# Patient Record
Sex: FEMALE | Race: BLACK OR AFRICAN AMERICAN | HISPANIC OR LATINO | Employment: FULL TIME | URBAN - METROPOLITAN AREA
[De-identification: names, ages, dates, MRNs, and addresses within clinical notes are randomized per-mention and may not be internally consistent; named-entity substitution may affect disease eponyms.]

---

## 2018-09-09 ENCOUNTER — OFFICE VISIT (OUTPATIENT)
Dept: URGENT CARE | Facility: CLINIC | Age: 23
End: 2018-09-09
Payer: COMMERCIAL

## 2018-09-09 VITALS
OXYGEN SATURATION: 96 % | HEART RATE: 82 BPM | SYSTOLIC BLOOD PRESSURE: 138 MMHG | WEIGHT: 179 LBS | RESPIRATION RATE: 16 BRPM | HEIGHT: 70 IN | TEMPERATURE: 98.8 F | DIASTOLIC BLOOD PRESSURE: 86 MMHG | BODY MASS INDEX: 25.62 KG/M2

## 2018-09-09 DIAGNOSIS — J02.9 SORE THROAT: ICD-10-CM

## 2018-09-09 DIAGNOSIS — J06.9 VIRAL UPPER RESPIRATORY TRACT INFECTION WITH COUGH: ICD-10-CM

## 2018-09-09 LAB
INT CON NEG: NEGATIVE
INT CON POS: POSITIVE
S PYO AG THROAT QL: NEGATIVE

## 2018-09-09 PROCEDURE — 99204 OFFICE O/P NEW MOD 45 MIN: CPT | Performed by: NURSE PRACTITIONER

## 2018-09-09 PROCEDURE — 87880 STREP A ASSAY W/OPTIC: CPT | Performed by: NURSE PRACTITIONER

## 2018-09-09 RX ORDER — BENZONATATE 100 MG/1
100 CAPSULE ORAL 3 TIMES DAILY PRN
Qty: 30 CAP | Refills: 0 | Status: SHIPPED | OUTPATIENT
Start: 2018-09-09 | End: 2023-06-27

## 2018-09-09 NOTE — PROGRESS NOTES
"Chief Complaint   Patient presents with   • Cough     sob, hurts to breath x 3 days        HISTORY OF PRESENT ILLNESS: Patient is a 23 y.o. female who presents today due to complaints of a sore throat for the past three days. Reports associated cough, ear pain, and pain with swallowing. Pain is currently rated as moderate. Denies associated congestion, fever, sinus pressure, or difficulty breathing. She has tried OTC medications at home without much improvement. She admits to several friends who have strep, would like to be tested.      There are no active problems to display for this patient.      Allergies:Penicillins    No current Epic-ordered outpatient prescriptions on file.     No current Epic-ordered facility-administered medications on file.        History reviewed. No pertinent past medical history.    Social History   Substance Use Topics   • Smoking status: Never Smoker   • Smokeless tobacco: Never Used   • Alcohol use Yes       No family status information on file.   History reviewed. No pertinent family history.    ROS:  Review of Systems   Constitutional: Negative for fever, chills. Negative for weight loss and malaise/fatigue.   HENT: Positive for sore throat. Negative for ear pain, nosebleeds, congestion.   Eyes: Negative for vision changes.   Cardiovascular: Negative for chest pain, palpitations, orthopnea and leg swelling.   Respiratory: Positive for cough. Negative for sputum production, shortness of breath and wheezing.   Gastrointestinal: Negative for abdominal pain, nausea, vomiting or diarrhea.   Skin: Negative for rash, diaphoresis.     Exam:  Blood pressure 138/86, pulse 82, temperature 37.1 °C (98.8 °F), resp. rate 16, height 1.778 m (5' 10\"), weight 81.2 kg (179 lb), SpO2 96 %.  General: well-nourished, well-developed female in NAD  Head: normocephalic, atraumatic  Eyes: PERRLA, no conjunctival injection, acuity grossly intact, lids normal.  Ears: normal shape and symmetry, no tenderness, " no discharge. External canals are without any significant edema or erythema. Tympanic membranes are without any inflammation, no effusion. Gross auditory acuity is intact.  Nose: symmetrical without tenderness, no discharge.  Mouth/Throat: reasonable hygiene. There is minimal erythema, without exudates or tonsillar enlargement present.  Neck: no masses, range of motion within normal limits, no tracheal deviation. No obvious thyroid enlargement.   Lymph: bilateral anterior cervical adenopathy. No supraclavicular adenopathy.   Neuro: alert and oriented. Cranial nerves 1-12 grossly intact. No sensory deficit.   Cardiovascular: regular rate and rhythm. No edema.  Pulmonary: no distress. Chest is symmetrical with respiration, no wheezes, crackles, or rhonchi.   Musculoskeletal: no clubbing, appropriate muscle tone, gait is stable.  Skin: warm, dry, intact, no clubbing, no cyanosis, no rashes.   Psych: appropriate mood, affect, judgement.         Assessment/Plan:  1. Viral upper respiratory tract infection with cough  benzonatate (TESSALON) 100 MG Cap   2. Sore throat  POCT Rapid Strep A    maalox plus-benadryl-visc lidocaine (MAGIC MOUTHWASH)             POC strep negative      Discussed symptoms most likely viral, self limiting illness. Did not see any evidence of a bacterial process. Discussed natural progression and sx care. OTC motrin or tylenol for pain/fever control. Hand hygiene. Increase fluid intake, rest. Warm salt water gargles. Tessalon and MBX PRN.   Supportive care, differential diagnoses, and indications for immediate follow-up discussed with patient.   Pathogenesis of diagnosis discussed including typical length and natural progression.   Instructed to return to clinic or nearest emergency department for any change in condition, further concerns, or worsening of symptoms.  Patient states understanding of the plan of care and discharge instructions.  Instructed to make an appointment, for follow up, with  her primary care provider.        Please note that this dictation was created using voice recognition software. I have made every reasonable attempt to correct obvious errors, but I expect that there are errors of grammar and possibly content that I did not discover before finalizing the note.      PRADEEP Mejia.

## 2022-11-10 ENCOUNTER — OFFICE VISIT (OUTPATIENT)
Dept: URGENT CARE | Facility: PHYSICIAN GROUP | Age: 27
End: 2022-11-10
Payer: COMMERCIAL

## 2022-11-10 VITALS
RESPIRATION RATE: 16 BRPM | HEART RATE: 94 BPM | SYSTOLIC BLOOD PRESSURE: 110 MMHG | WEIGHT: 185 LBS | TEMPERATURE: 98.3 F | HEIGHT: 70 IN | OXYGEN SATURATION: 95 % | DIASTOLIC BLOOD PRESSURE: 60 MMHG | BODY MASS INDEX: 26.48 KG/M2

## 2022-11-10 DIAGNOSIS — R06.2 WHEEZE: ICD-10-CM

## 2022-11-10 DIAGNOSIS — J22 LRTI (LOWER RESPIRATORY TRACT INFECTION): ICD-10-CM

## 2022-11-10 PROCEDURE — 99203 OFFICE O/P NEW LOW 30 MIN: CPT | Performed by: FAMILY MEDICINE

## 2022-11-10 RX ORDER — DEXAMETHASONE 6 MG/1
6 TABLET ORAL DAILY
Qty: 3 TABLET | Refills: 0 | Status: SHIPPED | OUTPATIENT
Start: 2022-11-10 | End: 2023-06-27

## 2022-11-10 RX ORDER — ALBUTEROL SULFATE 90 UG/1
2 AEROSOL, METERED RESPIRATORY (INHALATION) EVERY 6 HOURS PRN
Qty: 8.5 G | Refills: 3 | Status: SHIPPED | OUTPATIENT
Start: 2022-11-10 | End: 2023-06-27

## 2022-11-10 RX ORDER — DEXTROMETHORPHAN HYDROBROMIDE AND PROMETHAZINE HYDROCHLORIDE 15; 6.25 MG/5ML; MG/5ML
5 SYRUP ORAL EVERY 6 HOURS PRN
Qty: 120 ML | Refills: 0 | Status: SHIPPED | OUTPATIENT
Start: 2022-11-10 | End: 2023-06-27

## 2022-11-10 RX ORDER — DOXYCYCLINE HYCLATE 100 MG
100 TABLET ORAL EVERY 12 HOURS
Qty: 14 TABLET | Refills: 0 | Status: SHIPPED | OUTPATIENT
Start: 2022-11-10 | End: 2022-11-17

## 2022-11-10 ASSESSMENT — ENCOUNTER SYMPTOMS
COUGH: 1
HEADACHES: 1

## 2022-11-10 NOTE — PROGRESS NOTES
"Anayeli Barnhart is a 27 y.o. female who presents with Cough (Runny nose, headache, x11 days )      - This is a pleasant and nontoxic appearing 27 y.o. female who has come to the walk-in clinic today for:    #1) ~12 days ago developed cough and sinus congestion, not improving on its own. Worse at night and keeps up. No associated NVFC. Did a cpl home covid tests and negative.       ALLERGIES:  Penicillins     PMH:  History reviewed. No pertinent past medical history.     PSH:  History reviewed. No pertinent surgical history.    MEDS:    Current Outpatient Medications:     doxycycline (VIBRAMYCIN) 100 MG Tab, Take 1 Tablet by mouth every 12 hours for 7 days., Disp: 14 Tablet, Rfl: 0    promethazine-dextromethorphan (PROMETHAZINE-DM) 6.25-15 MG/5ML syrup, Take 5 mL by mouth every 6 hours as needed for Cough., Disp: 120 mL, Rfl: 0    dexamethasone (DECADRON) 6 MG Tab, Take 1 Tablet by mouth every day., Disp: 3 Tablet, Rfl: 0    albuterol 108 (90 Base) MCG/ACT Aero Soln inhalation aerosol, Inhale 2 Puffs every 6 hours as needed for Shortness of Breath., Disp: 8.5 g, Rfl: 3    benzonatate (TESSALON) 100 MG Cap, Take 1 Cap by mouth 3 times a day as needed., Disp: 30 Cap, Rfl: 0    maalox plus-benadryl-visc lidocaine (MAGIC MOUTHWASH), Take 5 mL by mouth every 6 hours as needed., Disp: 100 mL, Rfl: 0    ** I have documented what I find to be significant in regards to past medical, social, family and surgical history  in my HPI or under PMH/PSH/FH review section, otherwise it is noncontributory **         HPI    Review of Systems   HENT:  Positive for congestion.    Respiratory:  Positive for cough.    Neurological:  Positive for headaches.   All other systems reviewed and are negative.           Objective     /60 (BP Location: Right arm, Patient Position: Sitting, BP Cuff Size: Adult)   Pulse 94   Temp 36.8 °C (98.3 °F) (Temporal)   Resp 16   Ht 1.778 m (5' 10\")   Wt 83.9 kg (185 lb)   SpO2 95%   " BMI 26.54 kg/m²      Physical Exam  Vitals and nursing note reviewed.   Constitutional:       General: She is not in acute distress.     Appearance: Normal appearance. She is well-developed.   HENT:      Head: Normocephalic.      Mouth/Throat:      Mouth: Mucous membranes are moist.      Pharynx: Oropharynx is clear. Posterior oropharyngeal erythema present. No oropharyngeal exudate.   Cardiovascular:      Heart sounds: Normal heart sounds. No murmur heard.  Pulmonary:      Effort: Pulmonary effort is normal. No respiratory distress.      Breath sounds: Wheezing (mild exp wheeze) present.   Neurological:      Mental Status: She is alert.      Motor: No abnormal muscle tone.   Psychiatric:         Mood and Affect: Mood normal.         Behavior: Behavior normal.                           Assessment & Plan     1. LRTI (lower respiratory tract infection)  doxycycline (VIBRAMYCIN) 100 MG Tab    promethazine-dextromethorphan (PROMETHAZINE-DM) 6.25-15 MG/5ML syrup      2. Wheeze  dexamethasone (DECADRON) 6 MG Tab    albuterol 108 (90 Base) MCG/ACT Aero Soln inhalation aerosol          - Dx, plan & d/c instructions discussed   - Rest, stay hydrated  - OTC Flonase as needed    Follow up with your regular primary care providers office for a recheck on today's visit. ER if not improving in 2-3 days or if feeling/getting worse.     Any realistic side effects of medications that may have been given today reviewed.     Patient left in stable condition     POCT results reviewed/discussed

## 2023-04-03 ENCOUNTER — TELEPHONE (OUTPATIENT)
Dept: SCHEDULING | Facility: IMAGING CENTER | Age: 28
End: 2023-04-03
Payer: COMMERCIAL

## 2023-06-27 ENCOUNTER — OFFICE VISIT (OUTPATIENT)
Dept: MEDICAL GROUP | Facility: MEDICAL CENTER | Age: 28
End: 2023-06-27
Payer: COMMERCIAL

## 2023-06-27 VITALS
TEMPERATURE: 97.7 F | WEIGHT: 208 LBS | OXYGEN SATURATION: 98 % | HEART RATE: 93 BPM | BODY MASS INDEX: 29.78 KG/M2 | HEIGHT: 70 IN | DIASTOLIC BLOOD PRESSURE: 70 MMHG | SYSTOLIC BLOOD PRESSURE: 122 MMHG

## 2023-06-27 DIAGNOSIS — Z13.228 SCREENING FOR ENDOCRINE, METABOLIC AND IMMUNITY DISORDER: ICD-10-CM

## 2023-06-27 DIAGNOSIS — R53.82 CHRONIC FATIGUE: ICD-10-CM

## 2023-06-27 DIAGNOSIS — H47.323 DRUSEN OF BOTH OPTIC DISCS: ICD-10-CM

## 2023-06-27 DIAGNOSIS — H47.333 PSEUDOPAPILLEDEMA OF BOTH OPTIC DISCS: ICD-10-CM

## 2023-06-27 DIAGNOSIS — M25.541 ARTHRALGIA OF BOTH HANDS: ICD-10-CM

## 2023-06-27 DIAGNOSIS — Z11.59 NEED FOR HEPATITIS C SCREENING TEST: ICD-10-CM

## 2023-06-27 DIAGNOSIS — Z13.0 SCREENING FOR ENDOCRINE, METABOLIC AND IMMUNITY DISORDER: ICD-10-CM

## 2023-06-27 DIAGNOSIS — Z13.6 SCREENING FOR CARDIOVASCULAR CONDITION: ICD-10-CM

## 2023-06-27 DIAGNOSIS — M25.542 ARTHRALGIA OF BOTH HANDS: ICD-10-CM

## 2023-06-27 DIAGNOSIS — Z13.29 SCREENING FOR ENDOCRINE, METABOLIC AND IMMUNITY DISORDER: ICD-10-CM

## 2023-06-27 PROBLEM — J30.2 SEASONAL ALLERGIC RHINITIS: Status: ACTIVE | Noted: 2017-10-31

## 2023-06-27 PROBLEM — M51.369 DDD (DEGENERATIVE DISC DISEASE), LUMBAR: Status: ACTIVE | Noted: 2023-06-27

## 2023-06-27 PROBLEM — M51.36 DDD (DEGENERATIVE DISC DISEASE), LUMBAR: Status: ACTIVE | Noted: 2023-06-27

## 2023-06-27 PROCEDURE — 99214 OFFICE O/P EST MOD 30 MIN: CPT

## 2023-06-27 PROCEDURE — 3078F DIAST BP <80 MM HG: CPT

## 2023-06-27 PROCEDURE — 3074F SYST BP LT 130 MM HG: CPT

## 2023-06-27 ASSESSMENT — ENCOUNTER SYMPTOMS
CHILLS: 0
COUGH: 0
FEVER: 0
PALPITATIONS: 0
SHORTNESS OF BREATH: 0
ORTHOPNEA: 0

## 2023-06-27 ASSESSMENT — PATIENT HEALTH QUESTIONNAIRE - PHQ9: CLINICAL INTERPRETATION OF PHQ2 SCORE: 0

## 2023-06-27 NOTE — LETTER
[facility]  ODETTE Delgado  [address]  [place] [address]  Fax: [phone]   Authorization for Release/Disclosure of   Protected Health Information   Name: MAICOL FLOYD : 1995 SSN: [id number]   Address: [address] [place], [place] [address] [address] Phone:    There are no phone numbers on file.   I authorize the entity listed below to release/disclose the PHI below to:   [facility]/ODETTE Delgado and ODETTE Delgado   Provider or Entity Name:    Lens sujatha Vaughn    Address   City, State, [facility]   Phone:      Fax:     Reason for request: continuity of care   Information to be released:    [  ] LAST COLONOSCOPY,  including any PATH REPORT and follow-up  [  ] LAST FIT/COLOGUARD RESULT [  ] LAST DEXA  [  ] LAST MAMMOGRAM  [  ] LAST PAP  [  ] LAST LABS [  ] RETINA EXAM REPORT  [  ] IMMUNIZATION RECORDS  [  ] Release all info      [  ] Check here and initial the line next to each item to release ALL health information INCLUDING  _____ Care and treatment for drug and / or alcohol abuse  _____ HIV testing, infection status, or AIDS  _____ Genetic Testing    DATES OF SERVICE OR TIME PERIOD TO BE DISCLOSED: _____________  I understand and acknowledge that:  * This Authorization may be revoked at any time by you in writing, except if your health information has already been used or disclosed.  * Your health information that will be used or disclosed as a result of you signing this authorization could be re-disclosed by the recipient. If this occurs, your re-disclosed health information may no longer be protected by State or Federal laws.  * You may refuse to sign this Authorization. Your refusal will not affect your ability to obtain treatment.  * This Authorization becomes effective upon signing and will  on (date) __________.      If no date is indicated, this Authorization will  one (1) year from the signature date.    Name: Maicol  Christina Roberts  Signature: Date:   6/27/2023     PLEASE FAX REQUESTED RECORDS BACK TO: [phone]

## 2023-06-27 NOTE — PROGRESS NOTES
"Subjective:     CC: Establish Care (Prior pcp : [facility] ), Requesting Labs (Has family history of RA and thyroid issues ), and Other (Pt states she went to the eye dr on 6/17 and was told she has pressure and fluid behind both eyes )      HPI:   Megan is a 28 y.o. female who presents today for:     Eye concern: Patient was seen at the optometrist on 6/17/23 and was told that she had fluid behind her eyes concerning for pseudopapilledema vs papilledema OU or optic disc drusen bilaterally. She endorses diplopia and trouble with night vision. She also has tinnitus at times.  Optometrist requested referral to ophthalmology.    Joint pain: she reports swelling and pain in her fingers.  On both hands.  She is requesting labs, as her father has rheumatoid arthritis.    Fatigue: her  states that she snores. He has not noticed apnea. She also has fatigue. She reports not feeling rested after sleeping. She could sleep for 15+ hours and not feel rested.       Allergies: Penicillins     Medications:   Current Outpatient Medications:     levonorgestrel (MIRENA) 20 MCG/DAY IUD, 1 Each by Intrauterine route one time., Disp: , Rfl:       ROS:  Review of Systems   Constitutional:  Negative for chills and fever.   Respiratory:  Negative for cough and shortness of breath.    Cardiovascular:  Negative for chest pain, palpitations, orthopnea and leg swelling.       Objective:     Exam:  /70 (BP Location: Left arm, Patient Position: Sitting, BP Cuff Size: Adult)   Pulse 93   Temp 36.5 °C (97.7 °F) (Temporal)   Ht 1.778 m (5' 10\")   Wt 94.3 kg (208 lb)   SpO2 98%   BMI 29.84 kg/m²  Body mass index is 29.84 kg/m².    Physical Exam  Constitutional:       Appearance: Normal appearance.   Eyes:      Pupils: Pupils are equal, round, and reactive to light.   Cardiovascular:      Rate and Rhythm: Normal rate and regular rhythm.      Pulses: Normal pulses.      Heart sounds: Normal heart sounds.   Pulmonary:      " Effort: Pulmonary effort is normal.      Breath sounds: Normal breath sounds.   Abdominal:      General: Bowel sounds are normal.      Palpations: Abdomen is soft.   Neurological:      Mental Status: She is alert and oriented to person, place, and time.   Psychiatric:         Mood and Affect: Mood normal.         Behavior: Behavior normal.           Assessment & Plan:     Megan dunn 28 y.o. female with the following -     1. Pseudopapilledema of both optic discs  2. Drusen of both optic discs  Undiagnosed problem with uncertain prognosis  Referral placed to ophthalmology per optometrist request.  Concern for pseudopapilledema versus papilledema versus drusen of both optic discs.  - Referral to Ophthalmology    3. Arthralgia of both hands  Undiagnosed problem with uncertain prognosis  This has been getting progressively worse.  She states that her father has RA, and her sister is also getting tested for RA as a both experiencing arthralgia and fatigue.  - CCP ANTIBODY; Future  - RHEUMATOID ARTHRITIS FACTOR; Future  - DX-JOINT SURVEY-HANDS SINGLE VIEW; Future  - DX-JOINT SURVEY-FEET SINGLE VIEW; Future    4. Chronic fatigue  Undiagnosed problem with uncertain prognosis  She does not wake up feeling rested after sleeping for up to 15 hours.  Her  does state that she snores.  Concern for possible BRIAN versus fatigue secondary to rheumatological conditions such as RA.  - Referral to Pulmonary and Sleep Medicine    5. Need for hepatitis C screening test  - HEP C VIRUS ANTIBODY; Future    6. Screening for endocrine, metabolic and immunity disorder  - TSH WITH REFLEX TO FT4; Future  - Comp Metabolic Panel; Future  - CBC WITHOUT DIFFERENTIAL; Future    7. Screening for cardiovascular condition  - Lipid Profile; Future    Other orders  - levonorgestrel (MIRENA) 20 MCG/DAY IUD; 1 Each by Intrauterine route one time.        Anticipatory guidance included the following: Patient counseled about skin care, diet, supplements,  smoking, drugs/alcohol use, safe sex and exercise.     Return in about 4 weeks (around 7/25/2023) for F/U lab review.    Please note that this dictation was created using voice recognition software. I have made every reasonable attempt to correct obvious errors, but I expect that there are errors of grammar and possibly content that I did not discover before finalizing the note.

## 2023-07-06 ENCOUNTER — TELEPHONE (OUTPATIENT)
Dept: HEALTH INFORMATION MANAGEMENT | Facility: OTHER | Age: 28
End: 2023-07-06
Payer: COMMERCIAL

## 2023-08-04 ENCOUNTER — APPOINTMENT (OUTPATIENT)
Dept: RADIOLOGY | Facility: MEDICAL CENTER | Age: 28
End: 2023-08-04
Payer: COMMERCIAL

## 2023-10-26 ENCOUNTER — OFFICE VISIT (OUTPATIENT)
Dept: OPHTHALMOLOGY | Facility: MEDICAL CENTER | Age: 28
End: 2023-10-26
Payer: OTHER MISCELLANEOUS

## 2023-10-26 DIAGNOSIS — H47.10 OPTIC DISC EDEMA: ICD-10-CM

## 2023-10-26 PROCEDURE — 92250 FUNDUS PHOTOGRAPHY W/I&R: CPT | Performed by: STUDENT IN AN ORGANIZED HEALTH CARE EDUCATION/TRAINING PROGRAM

## 2023-10-26 PROCEDURE — 99204 OFFICE O/P NEW MOD 45 MIN: CPT | Mod: 25 | Performed by: STUDENT IN AN ORGANIZED HEALTH CARE EDUCATION/TRAINING PROGRAM

## 2023-10-26 PROCEDURE — 92083 EXTENDED VISUAL FIELD XM: CPT | Performed by: STUDENT IN AN ORGANIZED HEALTH CARE EDUCATION/TRAINING PROGRAM

## 2023-10-26 ASSESSMENT — REFRACTION_MANIFEST
METHOD_AUTOREFRACTION: 1
OD_AXIS: 083
OS_SPHERE: +0.25
OS_CYLINDER: +0.25
OD_SPHERE: -0.25
OD_CYLINDER: +0.50
OS_AXIS: 110

## 2023-10-26 ASSESSMENT — CONF VISUAL FIELD
OS_NORMAL: 1
OD_NORMAL: 1
OD_SUPERIOR_NASAL_RESTRICTION: 0
OD_INFERIOR_TEMPORAL_RESTRICTION: 0
OS_SUPERIOR_NASAL_RESTRICTION: 0
OS_SUPERIOR_TEMPORAL_RESTRICTION: 0
OS_INFERIOR_NASAL_RESTRICTION: 0
OS_INFERIOR_TEMPORAL_RESTRICTION: 0
OD_INFERIOR_NASAL_RESTRICTION: 0
OD_SUPERIOR_TEMPORAL_RESTRICTION: 0

## 2023-10-26 ASSESSMENT — CUP TO DISC RATIO
OD_RATIO: 0.0
OS_RATIO: 0.0

## 2023-10-26 ASSESSMENT — VISUAL ACUITY
OS_SC: 20/25-1
OD_SC: J2
OS_SC: J2
METHOD: SNELLEN - LINEAR
OD_SC: 20/20-1

## 2023-10-26 ASSESSMENT — ENCOUNTER SYMPTOMS: BLURRED VISION: 1

## 2023-10-26 ASSESSMENT — TONOMETRY
OD_IOP_MMHG: 18
OS_IOP_MMHG: 17

## 2023-10-26 ASSESSMENT — EXTERNAL EXAM - LEFT EYE: OS_EXAM: NORMAL

## 2023-10-26 ASSESSMENT — SLIT LAMP EXAM - LIDS
COMMENTS: NORMAL
COMMENTS: NORMAL

## 2023-10-26 ASSESSMENT — EXTERNAL EXAM - RIGHT EYE: OD_EXAM: NORMAL

## 2023-10-26 NOTE — PROGRESS NOTES
Peds/Neuro Ophthalmology:   Myron Weston M.D.    Date & Time note created:    10/26/2023   5:54 PM     Referring MD / APRN:  ODETTE Delgado, No att. providers found    Patient ID:  Name:             Megan Roberts   YOB: 1995  Age:                 28 y.o.  female   MRN:               [id number]    Chief Complaint/Reason for Visit:     Blurred Vision      History of Present Illness:  Ms Roberts is a 28 year old woman who presents as a new patient for concerns of  idiopathic intercranial hypertension.    Patient was seen by [facility] 6/17/23 and then later evaluated by [facility] where there was concern for optic disc elevation. MRI brain wwo and MRV head were completed at [facility] 8/11/23. Venous imaging demonstrates narrowing of the transverse and sigmoid sinuses bilaterally, but no CVST. MRI brain demonstrated optic nerve sheath dilation and some flattening of the sella.    In regards to vision patient wears glasses for night driving and computer.She denies any double vision. She reports difficulty focusing and blurred vision when reading, which patient attributed to using electronics. She denies any transient visual obscurations, but does endorse pulsatile tinnitus. Of note patient was seen in the Ed as a sophomore in college for severe headache where there was concern for optic disc edema at that time. She denies any recent weight gain, retinoic acid use, tetracycline use.     Patient has a + history of migraines since childhood and experiences some head pain all the time. Mom had migraines.Testing to see if patient has rheumatoid arthritis. Headaches are described as a pounding periorbital pain, which have been worsening over the past few months. With the severe headaches she has light sensitivity, occasional nausea, and tinnitus. The severe headaches last all day. She reports a daily dull headache. She currently does not take any medications for headaches.  "She denies any positional component to the headaches.       Weight: 94.3 kg  Height: 5'10\"        Review of Systems:  Review of Systems   Eyes:  Positive for blurred vision.   Neurological:         Migraines   All other systems reviewed and are negative.      Past Medical History:   History reviewed. No pertinent past medical history.    Past Surgical History:  History reviewed. No pertinent surgical history.    Current Outpatient Medications:  Current Outpatient Medications   Medication Sig Dispense Refill    levonorgestrel (MIRENA) 20 MCG/DAY IUD 1 Each by Intrauterine route one time.       No current facility-administered medications for this visit.       Allergies:  Allergies   Allergen Reactions    Penicillins Hives       Family History:  Family History   Problem Relation Age of Onset    Thyroid Mother     Rheumatologic Disease Father         RA    Rheumatologic Disease Paternal Grandmother         Lupus    Hypertension Paternal Grandmother        Social History:  Social History     Socioeconomic History    Marital status: Single     Spouse name: Not on file    Number of children: Not on file    Years of education: Not on file    Highest education level: Not on file   Occupational History    Not on file   Tobacco Use    Smoking status: Never    Smokeless tobacco: Never   Vaping Use    Vaping Use: Never used   Substance and Sexual Activity    Alcohol use: Yes     Comment: estimate once a week    Drug use: No    Sexual activity: Not on file   Other Topics Concern    Not on file   Social History Narrative    Not on file     Social Determinants of Health     Financial Resource Strain: Not on file   Food Insecurity: Not on file   Transportation Needs: Not on file   Physical Activity: Not on file   Stress: Not on file   Social Connections: Not on file   Intimate Partner Violence: Not on file   Housing Stability: Not on file          Physical Exam:  Physical Exam    Oriented x 3  Weight/BMI: There is no height or " weight on file to calculate BMI.  There were no vitals taken for this visit.    Base Eye Exam       Visual Acuity (Snellen - Linear)         Right Left    Dist sc 20/20-1 20/25-1    Near sc J2 J2              Tonometry ( care, 7:57 AM)         Right Left    Pressure 18 17              Pupils         Pupils Dark Light Shape React APD    Right PERRL 4 3 Round Brisk None    Left PERRL 4 3 Round Brisk None              Visual Fields         Right Left     Full Full              Extraocular Movement         Right Left     Full, Ortho Full, Ortho              Neuro/Psych       Oriented x3: Yes    Mood/Affect: Normal                  Additional Tests       Color         Right Left    Ishihara 9/9 9/9              Stereo       Fly: +    Animals: 3/3    Circles: 6/9                  Slit Lamp and Fundus Exam       External Exam         Right Left    External Normal Normal              Slit Lamp Exam         Right Left    Lids/Lashes Normal Normal    Conjunctiva/Sclera White and quiet White and quiet    Cornea Clear Clear    Anterior Chamber Deep and quiet Deep and quiet    Iris Round and reactive Round and reactive    Lens Clear Clear              Fundus Exam         Right Left    Disc 360 disc elevation, blurred margins 360 disc elevation, blurred margins    C/D Ratio 0.0 0.0    Macula Normal Normal                  Refraction       Manifest Refraction (Auto)         Sphere Cylinder Axis    Right -0.25 +0.50 083    Left +0.25 +0.25 110                    Pertinent Lab/Test/Imaging Review:  MRI brain [facility] 8/11/23 (report only)  FINDINGS: No evidence of mass, hydrocephalus or extra-axial collection. No evidence of hemorrhage or infarction. No significant foci of abnormal signal within the brain parenchyma. No unusual enhancement following administration of gadolinium contrast.   Optic nerve sheaths are slightly distended with CSF.  The suprasellar cistern is prominent tendinous pituitary gland is appears to be slightly  compressed within the sella.  No evidence of tonsillar ectopia.  Superior sagittal sinus and transverse sinuses appear to be patent though they     MRV head (report only)  FINDINGS:   There is symmetric focal attenuation of lateral transverse-proximal sigmoid sinuses either related to congenital variant or chronic venous stenosis.  The left sigmoid sinus is smaller than the right.  Superior sagittal sinus, straight sinus, proximal transverse sinuses, proximal jugular veins appear normal.  No filling defects seen within the major dural venous sinuses to suggest acute thrombus.     Assessment and Plan:     Optic disc edema  29 yo woman presents with history of headaches noted to have disc elevation.       Exam: RNFL elevation 190OD and 206 OS. VA 20/20 OD and 20/25 OS, full color plates. MRI brain demonstrates a partially empty sella and optic nerve sheath dilation. MRV negative for CVST    Plan:   Pt demographic and examination findings are consistent with IIH. Imaging negative. Given stable afferent visual function, will plan for lumbar puncture with opening pressure and start diamox afterwards. Discussed side effects of diamox and pregnancy precautions. Discussed weight loss as management of IIH.    -Lumbar puncture with opening pressure  -CSF : cell count, culture, protein, glucose, cytology , flow cytometry  -Plan to start Diamox 500mg BID  -RTC in 4-6 weeks following Diamox initiation        Myron Weston M.D.

## 2023-10-26 NOTE — ASSESSMENT & PLAN NOTE
27 yo woman presents with history of headaches noted to have disc elevation.       Exam: RNFL elevation 190OD and 206 OS. VA 20/20 OD and 20/25 OS, full color plates. MRI brain demonstrates a partially empty sella and optic nerve sheath dilation. MRV negative for CVST    Plan:   Pt demographic and examination findings are consistent with IIH. Imaging negative. Given stable afferent visual function, will plan for lumbar puncture with opening pressure and start diamox afterwards. Discussed side effects of diamox and pregnancy precautions. Discussed weight loss as management of IIH.    -Lumbar puncture with opening pressure  -CSF : cell count, culture, protein, glucose, cytology , flow cytometry  -Plan to start Diamox 500mg BID  -RTC in 4-6 weeks following Diamox initiation

## 2023-10-26 NOTE — PROCEDURES
OD: Nasal/superior/inferior elevation and blurred margins. Pink, CD 0.3.  OS:  360 elevation and blurred margins. Pink. CD0.43

## 2023-10-26 NOTE — PROCEDURES
OD: low test reliability. Rare non specific misses. VFI 99%, MD -1.57, PSD 1.65  OS: rare non specific misses. VFI 98%, MD -3.27, PSD 1.76

## 2023-11-09 ENCOUNTER — PATIENT MESSAGE (OUTPATIENT)
Dept: OPHTHALMOLOGY | Facility: MEDICAL CENTER | Age: 28
End: 2023-11-09
Payer: OTHER MISCELLANEOUS

## 2023-11-16 DIAGNOSIS — H47.10 OPTIC DISC EDEMA: ICD-10-CM

## 2023-11-22 ENCOUNTER — OFFICE VISIT (OUTPATIENT)
Dept: URGENT CARE | Facility: PHYSICIAN GROUP | Age: 28
End: 2023-11-22
Payer: OTHER MISCELLANEOUS

## 2023-11-22 VITALS
RESPIRATION RATE: 16 BRPM | TEMPERATURE: 98.3 F | HEART RATE: 71 BPM | WEIGHT: 214 LBS | BODY MASS INDEX: 30.64 KG/M2 | DIASTOLIC BLOOD PRESSURE: 64 MMHG | SYSTOLIC BLOOD PRESSURE: 118 MMHG | HEIGHT: 70 IN | OXYGEN SATURATION: 98 %

## 2023-11-22 DIAGNOSIS — H66.002 NON-RECURRENT ACUTE SUPPURATIVE OTITIS MEDIA OF LEFT EAR WITHOUT SPONTANEOUS RUPTURE OF TYMPANIC MEMBRANE: Primary | ICD-10-CM

## 2023-11-22 DIAGNOSIS — H61.23 BILATERAL IMPACTED CERUMEN: ICD-10-CM

## 2023-11-22 PROCEDURE — 69210 REMOVE IMPACTED EAR WAX UNI: CPT

## 2023-11-22 PROCEDURE — 3074F SYST BP LT 130 MM HG: CPT

## 2023-11-22 PROCEDURE — 99213 OFFICE O/P EST LOW 20 MIN: CPT | Mod: 25

## 2023-11-22 PROCEDURE — 3078F DIAST BP <80 MM HG: CPT

## 2023-11-22 RX ORDER — CEFDINIR 300 MG/1
300 CAPSULE ORAL 2 TIMES DAILY
Qty: 14 CAPSULE | Refills: 0 | Status: SHIPPED | OUTPATIENT
Start: 2023-11-22 | End: 2023-11-22 | Stop reason: SDUPTHER

## 2023-11-22 RX ORDER — CEFDINIR 300 MG/1
300 CAPSULE ORAL 2 TIMES DAILY
Qty: 14 CAPSULE | Refills: 0 | Status: SHIPPED | OUTPATIENT
Start: 2023-11-22 | End: 2023-11-29

## 2023-11-22 ASSESSMENT — ENCOUNTER SYMPTOMS
WHEEZING: 0
BLURRED VISION: 0
SORE THROAT: 0
NAUSEA: 0
STRIDOR: 0
COUGH: 0
SHORTNESS OF BREATH: 0
SENSORY CHANGE: 0
DIZZINESS: 0
CHILLS: 0
DOUBLE VISION: 0
PALPITATIONS: 0
PHOTOPHOBIA: 0
MYALGIAS: 0
SPEECH CHANGE: 0
NECK PAIN: 0
VOMITING: 0
ABDOMINAL PAIN: 0
SEIZURES: 0
EYE PAIN: 0
FOCAL WEAKNESS: 0
LOSS OF CONSCIOUSNESS: 0
HEADACHES: 1
TINGLING: 0
FEVER: 0

## 2023-11-22 NOTE — PROGRESS NOTES
Subjective:   eMgan Roberts is a 28 y.o. female who presents for Otalgia (Both ears x 1 week)          I introduced myself to the patient and informed them that I am a family nurse practitioner.    HPI:Megan comes in today c/o L ear pain. Onset was 1 week ago. It has been gradually worsening. Patient describes symptoms as constant. They describe the pain as aching. Aggravating factors include lying on affected side, worse at night, touching the ear. Relieving factors include none. Treatments tried at home include none. They describe their symptoms as moderate.      Review of Systems   Constitutional:  Positive for malaise/fatigue. Negative for chills and fever.   HENT:  Positive for ear pain. Negative for congestion and sore throat.    Eyes:  Negative for blurred vision, double vision, photophobia and pain.   Respiratory:  Negative for cough, shortness of breath, wheezing and stridor.    Cardiovascular:  Negative for chest pain and palpitations.   Gastrointestinal:  Negative for abdominal pain, nausea and vomiting.   Musculoskeletal:  Negative for myalgias and neck pain.   Neurological:  Positive for headaches. Negative for dizziness, tingling, sensory change, speech change, focal weakness, seizures and loss of consciousness.       Medications: levonorgestrel    Allergies: Penicillins    Problem List: does not have any pertinent problems on file.    Surgical History:  No past surgical history on file.    Past Social Hx:   reports that she has never smoked. She has never used smokeless tobacco. She reports current alcohol use. She reports that she does not use drugs.     Past Family Hx:   family history includes Hypertension in her paternal grandmother; Rheumatologic Disease in her father and paternal grandmother; Thyroid in her mother.     Problem list, medications, and allergies reviewed by myself today in Epic.   I have documented what I find to be significant in regards to past medical, social,  "family and surgical history  in my HPI or under PMH/PSH/FH review section, otherwise it is noncontributory     Objective:     /64 (BP Location: Right arm, Patient Position: Sitting, BP Cuff Size: Adult)   Pulse 71   Temp 36.8 °C (98.3 °F) (Temporal)   Resp 16   Ht 1.778 m (5' 10\")   Wt 97.1 kg (214 lb)   SpO2 98%   BMI 30.71 kg/m²     During this visit, appropriate PPE was worn, and hand hygiene was performed.    Physical Exam  Vitals reviewed.   Constitutional:       General: She is not in acute distress.     Appearance: Normal appearance. She is normal weight. She is not ill-appearing or toxic-appearing.   HENT:      Head: Normocephalic and atraumatic.      Right Ear: External ear normal. There is impacted cerumen.      Left Ear: External ear normal. There is impacted cerumen.      Ears:      Comments: Post cerumen removal, left ear canal adjacent to TM is erythematous, TM is erythematous and injected, bulging, no light reflex, tan-colored fluid present behind TM consistent with otitis media.        Nose: No congestion or rhinorrhea.   Eyes:      General: No scleral icterus.        Right eye: No discharge.         Left eye: No discharge.      Extraocular Movements: Extraocular movements intact.      Conjunctiva/sclera: Conjunctivae normal.   Cardiovascular:      Rate and Rhythm: Normal rate.   Pulmonary:      Effort: Pulmonary effort is normal.   Abdominal:      General: There is no distension.   Genitourinary:     Comments: Deferred  Musculoskeletal:         General: No swelling or tenderness. Normal range of motion.      Right lower leg: No edema.      Left lower leg: No edema.   Skin:     General: Skin is warm and dry.   Neurological:      General: No focal deficit present.      Mental Status: She is alert and oriented to person, place, and time. Mental status is at baseline.   Psychiatric:         Mood and Affect: Mood normal.         Behavior: Behavior normal.       Procedure - cerumen removal  I " did attempt to remove cerumen from both ears with a lighted curette with fair result and removal of boluses of cerumen from bilateral ears.  Patient was able to tolerated fairly well at first but then complained of irritation and sensitivity.  Otoscopic exam revealed there was still a significant amount of cerumen present in bilateral ear canals.  Ear lavage was performed after instilling a softening agent.  This achieved good result, otoscopic exam following lavage revealed that bilateral ear canals were clear of cerumen, R canal and TM was normal with good landmarks and no signs of infection, however left ear canal adjacent to TM is erythematous, TM is erythematous and injected, bulging, no light reflex, tan-colored fluid present behind TM consistent with otitis media.       Assessment/Plan:     Diagnosis and associated orders:      Comments/MDM:     1. Non-recurrent acute suppurative otitis media of left ear without spontaneous rupture of tympanic membrane  I discussed with patient that their presentation and symptoms are consistent with acute otitis media.     I did instruct them to not put any objects into the ear canal including Q-tips, try not to scratch the ear canal with fingernail, if the ear is itchy or irritated try gentle massage of the ear    I did print out information regarding otitis media and antibiotics prescribed for the patient and I did go over these instructions with them and answered their questions.    Discussed with them using Tylenol alternated with ibuprofen for pain, heat pad may also be useful.  Do not take more than 3000 mg of Tylenol in 24 hours.  Start with ibuprofen lowest effective dose may go up to 800 mg every 8 hours, take with food.  Patient denies any history of GI bleeding, vomiting blood, blood in stool, peptic ulcer disease, gastric ulcers.    We discussed red flags and when to return to the urgent care versus when to go to the emergency room.  Patient states they  understand all instructions and are agreeable to the plan of care.   - cefdinir (OMNICEF) 300 MG Cap; Take 1 Capsule by mouth 2 times a day for 7 days.  Dispense: 14 Capsule; Refill: 0    2. Bilateral impacted cerumen  Cerumen removal procedure as per procedure note above.  Patient was instructed regarding Debrox if she builds up wax in the future, written information given, I did go over this with the patient in the clinic and answer questions.         Pt is clinically stable at today's acute urgent care visit. Vital signs are normal and reassuring.  No acute distress noted. Appropriate for outpatient management at this time.       Discussed DDx, management options (risks,benefits, and alternatives to planned treatment), natural progression and supportive care.  Patient states they have good understanding and the treatment plan was agreed upon. Questions were encouraged and answered   Return to urgent care prn if new or worsening sx or if there is no improvement in condition prn.    Advised the patient to follow-up with the primary care physician for recheck, reevaluation, and consideration of further management.  I instructed patient to get a pharmacy consult when picking up any prescribed medications.  Strict ER precautions discussed for any  chest pain, mastoid pain or swelling, difficulty breathing, difficulty swallowing, wheezing, stridor, or drooling, fever that does not respond to ibuprofen and Tylenol, inability to tolerate fluids, dehydration, lethargy.   Patient states they understand all instructions.     I personally reviewed prior external notes and test results pertinent to today's visit.  I have independently reviewed and interpreted all diagnostics ordered during this urgent care acute visit.        Please note that this dictation was created using voice recognition software. I have made a reasonable attempt to correct obvious errors, but I expect that there are errors of grammar and possibly  content that I did not discover before finalizing the note.    This note was electronically signed by Asher HOBBS, PETER, DO, REESE

## 2023-12-07 ENCOUNTER — TELEPHONE (OUTPATIENT)
Dept: OPHTHALMOLOGY | Facility: MEDICAL CENTER | Age: 28
End: 2023-12-07
Payer: OTHER MISCELLANEOUS

## 2023-12-07 NOTE — TELEPHONE ENCOUNTER
Attempted to call patient regarding LP results. Opening pressure [id number]. No pleocytosis or protein elevation. Will plan to start diamox for IIH

## 2023-12-08 ENCOUNTER — TELEPHONE (OUTPATIENT)
Dept: OPHTHALMOLOGY | Facility: MEDICAL CENTER | Age: 28
End: 2023-12-08
Payer: OTHER MISCELLANEOUS

## 2023-12-08 RX ORDER — ACETAZOLAMIDE 500 MG/1
500 CAPSULE, EXTENDED RELEASE ORAL 2 TIMES DAILY
Qty: 60 CAPSULE | Refills: 1 | Status: SHIPPED | OUTPATIENT
Start: 2023-12-08 | End: 2024-02-09 | Stop reason: SDUPTHER

## 2023-12-11 DIAGNOSIS — G93.2 IIH (IDIOPATHIC INTRACRANIAL HYPERTENSION): ICD-10-CM

## 2024-02-09 RX ORDER — ACETAZOLAMIDE 500 MG/1
500 CAPSULE, EXTENDED RELEASE ORAL 2 TIMES DAILY
Qty: 120 CAPSULE | Refills: 1 | Status: SHIPPED | OUTPATIENT
Start: 2024-02-09

## 2024-06-20 RX ORDER — ACETAZOLAMIDE 500 MG/1
500 CAPSULE, EXTENDED RELEASE ORAL 2 TIMES DAILY
Qty: 60 CAPSULE | Refills: 1 | Status: SHIPPED | OUTPATIENT
Start: 2024-06-20

## 2024-07-09 ENCOUNTER — OFFICE VISIT (OUTPATIENT)
Dept: OPHTHALMOLOGY | Facility: MEDICAL CENTER | Age: 29
End: 2024-07-09
Payer: COMMERCIAL

## 2024-07-09 DIAGNOSIS — G93.2 IIH (IDIOPATHIC INTRACRANIAL HYPERTENSION): ICD-10-CM

## 2024-07-09 PROBLEM — H47.10 OPTIC DISC EDEMA: Status: RESOLVED | Noted: 2023-10-26 | Resolved: 2024-07-09

## 2024-07-09 PROCEDURE — 99213 OFFICE O/P EST LOW 20 MIN: CPT | Mod: 25 | Performed by: STUDENT IN AN ORGANIZED HEALTH CARE EDUCATION/TRAINING PROGRAM

## 2024-07-09 PROCEDURE — 92133 CPTRZD OPH DX IMG PST SGM ON: CPT | Performed by: STUDENT IN AN ORGANIZED HEALTH CARE EDUCATION/TRAINING PROGRAM

## 2024-07-09 RX ORDER — IBUPROFEN 600 MG/1
600 TABLET ORAL
COMMUNITY

## 2024-07-09 ASSESSMENT — CONF VISUAL FIELD
OD_SUPERIOR_TEMPORAL_RESTRICTION: 0
OD_INFERIOR_TEMPORAL_RESTRICTION: 0
OD_SUPERIOR_NASAL_RESTRICTION: 0
OS_NORMAL: 1
OD_INFERIOR_NASAL_RESTRICTION: 0
OS_INFERIOR_NASAL_RESTRICTION: 0
OS_SUPERIOR_TEMPORAL_RESTRICTION: 0
OD_NORMAL: 1
OS_INFERIOR_TEMPORAL_RESTRICTION: 0
OS_SUPERIOR_NASAL_RESTRICTION: 0

## 2024-07-09 ASSESSMENT — ENCOUNTER SYMPTOMS: BLURRED VISION: 1

## 2024-07-09 ASSESSMENT — VISUAL ACUITY
METHOD: SNELLEN - LINEAR
OD_SC: 20/20
OS_SC: 20/25

## 2024-07-09 ASSESSMENT — REFRACTION_MANIFEST
OD_SPHERE: +0.00
OS_CYLINDER: +0.50
OD_AXIS: 066
METHOD_AUTOREFRACTION: 1
OS_SPHERE: -0.50
OS_AXIS: 088
OD_CYLINDER: +0.25

## 2024-07-09 ASSESSMENT — CUP TO DISC RATIO
OS_RATIO: 0.0
OD_RATIO: 0.0

## 2024-07-09 ASSESSMENT — TONOMETRY
IOP_METHOD: I-CARE
OD_IOP_MMHG: 11
OS_IOP_MMHG: 11

## 2024-07-09 ASSESSMENT — SLIT LAMP EXAM - LIDS
COMMENTS: NORMAL
COMMENTS: NORMAL

## 2024-07-09 ASSESSMENT — EXTERNAL EXAM - LEFT EYE: OS_EXAM: NORMAL

## 2024-07-09 ASSESSMENT — EXTERNAL EXAM - RIGHT EYE: OD_EXAM: NORMAL

## 2024-08-22 RX ORDER — ACETAZOLAMIDE 500 MG/1
500 CAPSULE, EXTENDED RELEASE ORAL 2 TIMES DAILY
Qty: 60 CAPSULE | Refills: 1 | Status: SHIPPED | OUTPATIENT
Start: 2024-08-22

## 2024-09-04 ENCOUNTER — APPOINTMENT (OUTPATIENT)
Dept: OPHTHALMOLOGY | Facility: MEDICAL CENTER | Age: 29
End: 2024-09-04
Payer: COMMERCIAL

## 2024-09-04 DIAGNOSIS — G93.2 IIH (IDIOPATHIC INTRACRANIAL HYPERTENSION): ICD-10-CM

## 2024-09-04 PROCEDURE — 99213 OFFICE O/P EST LOW 20 MIN: CPT | Mod: 25 | Performed by: STUDENT IN AN ORGANIZED HEALTH CARE EDUCATION/TRAINING PROGRAM

## 2024-09-04 PROCEDURE — 92133 CPTRZD OPH DX IMG PST SGM ON: CPT | Performed by: STUDENT IN AN ORGANIZED HEALTH CARE EDUCATION/TRAINING PROGRAM

## 2024-09-04 RX ORDER — ACETAZOLAMIDE 500 MG/1
500 CAPSULE, EXTENDED RELEASE ORAL DAILY
Qty: 60 CAPSULE | Refills: 1 | Status: SHIPPED | OUTPATIENT
Start: 2024-09-04

## 2024-09-04 ASSESSMENT — CONF VISUAL FIELD
OS_INFERIOR_TEMPORAL_RESTRICTION: 0
OS_SUPERIOR_NASAL_RESTRICTION: 0
OD_NORMAL: 1
OD_SUPERIOR_NASAL_RESTRICTION: 0
OD_INFERIOR_NASAL_RESTRICTION: 0
OS_INFERIOR_NASAL_RESTRICTION: 0
OD_INFERIOR_TEMPORAL_RESTRICTION: 0
OS_NORMAL: 1
OD_SUPERIOR_TEMPORAL_RESTRICTION: 0
OS_SUPERIOR_TEMPORAL_RESTRICTION: 0

## 2024-09-04 ASSESSMENT — VISUAL ACUITY
OS_SC: 20/25
OD_SC: 20/20
METHOD: SNELLEN - LINEAR

## 2024-09-04 ASSESSMENT — REFRACTION_MANIFEST
OD_CYLINDER: +0.25
OD_AXIS: 067
OD_SPHERE: -0.25
OS_SPHERE: -0.25
METHOD_AUTOREFRACTION: 1
OS_CYLINDER: +0.50
OS_AXIS: 090

## 2024-09-04 ASSESSMENT — CUP TO DISC RATIO
OD_RATIO: 0.0
OS_RATIO: 0.0

## 2024-09-04 ASSESSMENT — TONOMETRY
IOP_METHOD: I-CARE
OD_IOP_MMHG: 15
OS_IOP_MMHG: 14

## 2024-09-04 ASSESSMENT — EXTERNAL EXAM - LEFT EYE: OS_EXAM: NORMAL

## 2024-09-04 ASSESSMENT — EXTERNAL EXAM - RIGHT EYE: OD_EXAM: NORMAL

## 2024-09-04 ASSESSMENT — SLIT LAMP EXAM - LIDS
COMMENTS: NORMAL
COMMENTS: NORMAL

## 2024-09-04 ASSESSMENT — ENCOUNTER SYMPTOMS: BLURRED VISION: 1

## 2024-09-04 NOTE — ASSESSMENT & PLAN NOTE
29 yo woman presents with history of headaches noted to have disc elevation.       Exam 10/26/23: RNFL elevation 190OD and 206 OS. VA 20/20 OD and 20/25 OS, full color plates. MRI brain demonstrates a partially empty sella and optic nerve sheath dilation. MRV negative for CVST    Exam 7/9/24: RNFL 102  OS, VA 20/20 OD 20/25 OS, full color plates, full confrontational fields. Optic disc edema resolved.     Exam 9/4/24: RNFL 102  OS. VA 20/20 OD 20/25 OS, full color plates, full confrontational fields. Optic disc pink with sharp disc margins    Plan:   Resolved disc edema OU on Diamox 500mg BID. RNFL stable from prior visit therefore will decrease to 500mg daily. Pt endorses some fatigue on diamox, but denies concerns for pregnancy. Will decrease to 500mg daily and reevaluate in 2-3 months.     -RTC in 2-3 months on dose of Diamox 500mg daily  -Continue healthy lifestyle modifications

## 2024-09-04 NOTE — PROGRESS NOTES
Peds/Neuro Ophthalmology:   Myron Weston M.D.    Date & Time note created:    9/4/2024   8:00 AM     Referring MD / APRN:  ODETTE Delgado, No att. providers found    Patient ID:  Name:             Megan Roberts   YOB: 1995  Age:                 28 y.o.  female   MRN:               [id number]    Chief Complaint/Reason for Visit:     Other (2 month f.v. for IIH)      History of Present Illness:      Ms Roberts is a 28 year old woman who presents as follow up for concerns of  idiopathic intercranial hypertension. Patient was last seen 7/9/24    Megan reports no visual concerns or headaches, and endorses resolution of pulsatile tinnitus. She remains compliant with diamox 500mg BID but has noticed some issues with fatigue. She has continued to lose weight with intermittent fasting and walking, currently weighing 196lbs.     As a recap,  Patient was seen by [facility] 6/17/23 and then later evaluated by [facility] where there was concern for optic disc elevation. MRI brain wwo and MRV head were completed at [facility] 8/11/23. Venous imaging demonstrates narrowing of the transverse and sigmoid sinuses bilaterally, but no CVST. MRI brain demonstrated optic nerve sheath dilation and some flattening of the sella. MRV negative for CVST. LP was therefore ordered and demonstrated an opening pressure of [id number] and patient was started on Diamox 500mg BID.     In regards to vision patient wears glasses for night driving and computer.She denies any double vision. She reports difficulty focusing and blurred vision when reading, which patient attributed to using electronics. She denies any transient visual obscurations, but does endorse pulsatile tinnitus. Of note patient was seen in the Ed as a sophomore in college for severe headache where there was concern for optic disc edema at that time. She denies any recent weight gain, retinoic acid use, tetracycline use.     Patient  "has a + history of migraines since childhood and experiences some head pain all the time. Mom had migraines.Testing to see if patient has rheumatoid arthritis. Headaches are described as a pounding periorbital pain, which have been worsening over the past few months. With the severe headaches she has light sensitivity, occasional nausea, and tinnitus. The severe headaches last all day. She reports a daily dull headache. She currently does not take any medications for headaches. She denies any positional component to the headaches.     Weight: 94.3 kg  Height: 5'10\"    Other        Review of Systems:  Review of Systems   Eyes:  Positive for blurred vision.   Neurological:         Migraines   All other systems reviewed and are negative.      Past Medical History:   Past Medical History:   Diagnosis Date    Migraine        Past Surgical History:  No past surgical history on file.    Current Outpatient Medications:  Current Outpatient Medications   Medication Sig Dispense Refill    acetaZOLAMIDE SR (DIAMOX) 500 MG CAPSULE SR 12 HR Take 1 Capsule by mouth every day. 60 Capsule 1    ibuprofen (MOTRIN) 600 MG Tab Take 600 mg by mouth. (Patient not taking: Reported on 7/9/2024)      asa/apap/caffeine (EXCEDRIN) 250-250-65 MG Tab Take 1 Tablet by mouth. (Patient not taking: Reported on 7/9/2024)      levonorgestrel (MIRENA) 20 MCG/DAY IUD 1 Each by Intrauterine route one time.       No current facility-administered medications for this visit.       Allergies:  Allergies   Allergen Reactions    Penicillins Hives       Family History:  Family History   Problem Relation Age of Onset    Thyroid Mother     Rheumatologic Disease Father         RA    Fibromyalgia Father     Rheumatologic Disease Paternal Grandmother         Lupus    Hypertension Paternal Grandmother        Social History:  Social History     Socioeconomic History    Marital status:      Spouse name: Not on file    Number of children: Not on file    Years of " education: Not on file    Highest education level: Not on file   Occupational History    Not on file   Tobacco Use    Smoking status: Never    Smokeless tobacco: Never   Vaping Use    Vaping status: Never Used   Substance and Sexual Activity    Alcohol use: Yes     Comment: estimate once a week    Drug use: No    Sexual activity: Not on file   Other Topics Concern    Not on file   Social History Narrative    Not on file     Social Determinants of Health     Financial Resource Strain: Not on file   Food Insecurity: Not on file   Transportation Needs: Not on file   Physical Activity: Not on file   Stress: Not on file   Social Connections: Not on file   Intimate Partner Violence: Not on file   Housing Stability: Not on file          Physical Exam:  Physical Exam    Oriented x 3  Weight/BMI: There is no height or weight on file to calculate BMI.  There were no vitals taken for this visit.    Base Eye Exam       Visual Acuity (Snellen - Linear)         Right Left    Dist sc 20/20 20/25    Dist ph sc  NI              Tonometry ([facility], 7:37 AM)         Right Left    Pressure 15 14              Pupils         Pupils Dark Light Shape React APD    Right PERRL 4 3 Round Brisk None    Left PERRL 4 3 Round Brisk None              Visual Fields         Right Left     Full Full              Extraocular Movement         Right Left     Full, Ortho Full, Ortho              Neuro/Psych       Oriented x3: Yes    Mood/Affect: Normal                  Additional Tests       Color         Right Left    Ishihara 9/9 9/9                  Slit Lamp and Fundus Exam       External Exam         Right Left    External Normal Normal              Slit Lamp Exam         Right Left    Lids/Lashes Normal Normal    Conjunctiva/Sclera White and quiet White and quiet    Cornea Clear Clear    Anterior Chamber Deep and quiet Deep and quiet    Iris Round and reactive Round and reactive    Lens Clear Clear              Fundus Exam         Right Left    Disc  pink, sharp disc margins pink, sharp disc margins    C/D Ratio 0.0 0.0    Macula Normal Normal                  Refraction       Manifest Refraction (Auto)         Sphere Cylinder Axis    Right -0.25 +0.25 067    Left -0.25 +0.50 090                    Pertinent Lab/Test/Imaging Review:  MRI brain [facility] 8/11/23 (report only)  FINDINGS: No evidence of mass, hydrocephalus or extra-axial collection. No evidence of hemorrhage or infarction. No significant foci of abnormal signal within the brain parenchyma. No unusual enhancement following administration of gadolinium contrast.   Optic nerve sheaths are slightly distended with CSF.  The suprasellar cistern is prominent tendinous pituitary gland is appears to be slightly compressed within the sella.  No evidence of tonsillar ectopia.  Superior sagittal sinus and transverse sinuses appear to be patent though they     MRV head (report only)  FINDINGS:   There is symmetric focal attenuation of lateral transverse-proximal sigmoid sinuses either related to congenital variant or chronic venous stenosis.  The left sigmoid sinus is smaller than the right.  Superior sagittal sinus, straight sinus, proximal transverse sinuses, proximal jugular veins appear normal.  No filling defects seen within the major dural venous sinuses to suggest acute thrombus.     Lumbar puncture 12/5/23: opening pressure 41 cmH20  Glucose 58, protein 17.3, 0 WBC    Assessment and Plan:     IIH (idiopathic intracranial hypertension)  29 yo woman presents with history of headaches noted to have disc elevation.       Exam 10/26/23: RNFL elevation 190OD and 206 OS. VA 20/20 OD and 20/25 OS, full color plates. MRI brain demonstrates a partially empty sella and optic nerve sheath dilation. MRV negative for CVST    Exam 7/9/24: RNFL 102  OS, VA 20/20 OD 20/25 OS, full color plates, full confrontational fields. Optic disc edema resolved.     Exam 9/4/24: RNFL 102  OS. VA 20/20 OD 20/25 OS, full color  plates, full confrontational fields. Optic disc pink with sharp disc margins    Plan:   Resolved disc edema OU on Diamox 500mg BID. RNFL stable from prior visit therefore will decrease to 500mg daily. Pt endorses some fatigue on diamox, but denies concerns for pregnancy. Will decrease to 500mg daily and reevaluate in 2-3 months.     -RTC in 2-3 months on dose of Diamox 500mg daily  -Continue healthy lifestyle modifications            Myron Weston M.D.

## 2024-11-05 ENCOUNTER — OFFICE VISIT (OUTPATIENT)
Dept: OPHTHALMOLOGY | Facility: MEDICAL CENTER | Age: 29
End: 2024-11-05
Payer: COMMERCIAL

## 2024-11-05 DIAGNOSIS — G93.2 IIH (IDIOPATHIC INTRACRANIAL HYPERTENSION): ICD-10-CM

## 2024-11-05 PROCEDURE — 99212 OFFICE O/P EST SF 10 MIN: CPT | Mod: 25 | Performed by: STUDENT IN AN ORGANIZED HEALTH CARE EDUCATION/TRAINING PROGRAM

## 2024-11-05 PROCEDURE — 92133 CPTRZD OPH DX IMG PST SGM ON: CPT | Performed by: STUDENT IN AN ORGANIZED HEALTH CARE EDUCATION/TRAINING PROGRAM

## 2024-11-05 ASSESSMENT — CONF VISUAL FIELD
OS_INFERIOR_NASAL_RESTRICTION: 0
OD_SUPERIOR_TEMPORAL_RESTRICTION: 0
OD_SUPERIOR_NASAL_RESTRICTION: 0
OS_SUPERIOR_TEMPORAL_RESTRICTION: 0
OS_INFERIOR_TEMPORAL_RESTRICTION: 0
OS_NORMAL: 1
OD_NORMAL: 1
OS_SUPERIOR_NASAL_RESTRICTION: 0
OD_INFERIOR_NASAL_RESTRICTION: 0
OD_INFERIOR_TEMPORAL_RESTRICTION: 0

## 2024-11-05 ASSESSMENT — CUP TO DISC RATIO
OD_RATIO: 0.0
OS_RATIO: 0.0

## 2024-11-05 ASSESSMENT — EXTERNAL EXAM - LEFT EYE: OS_EXAM: NORMAL

## 2024-11-05 ASSESSMENT — VISUAL ACUITY
OD_SC+: -1
OS_SC: 20/25
OD_SC: 20/20
METHOD: SNELLEN - LINEAR

## 2024-11-05 ASSESSMENT — REFRACTION_MANIFEST
OD_CYLINDER: +0.25
OS_SPHERE: +0.00
OS_CYLINDER: +0.25
OD_SPHERE: -0.25
OS_AXIS: 158
OD_AXIS: 068
METHOD_AUTOREFRACTION: 1

## 2024-11-05 ASSESSMENT — TONOMETRY
IOP_METHOD: I-CARE
OS_IOP_MMHG: 12
OD_IOP_MMHG: 12

## 2024-11-05 ASSESSMENT — SLIT LAMP EXAM - LIDS
COMMENTS: NORMAL
COMMENTS: NORMAL

## 2024-11-05 ASSESSMENT — EXTERNAL EXAM - RIGHT EYE: OD_EXAM: NORMAL

## 2024-11-05 ASSESSMENT — ENCOUNTER SYMPTOMS: BLURRED VISION: 1

## 2024-11-05 NOTE — PROGRESS NOTES
Peds/Neuro Ophthalmology:   Myron Weston M.D.    Date & Time note created:    11/5/2024   8:04 AM     Referring MD / APRN:  ODETTE Delgado, No att. providers found    Patient ID:  Name:             Megan Roberts   YOB: 1995  Age:                 28 y.o.  female   MRN:               [id number]    Chief Complaint/Reason for Visit:     Other (2 month f.v. for IIH)      History of Present Illness:      Ms Roberts is a 28 year old woman who presents as follow up for concerns of  idiopathic intercranial hypertension. Patient was last seen 9/4/24    At her last appt patient had resolved disc edema on Diamox 500mg BID. Diamox was therefore weaned to 500mg daily,     As a recap,  Patient was [facility] by [facility] and then later evaluated by [facility] where there was concern for optic disc elevation. MRI brain wwo and MRV head were completed at [facility] 8/11/23. Venous imaging demonstrates narrowing of the transverse and sigmoid sinuses bilaterally, but no CVST. MRI brain demonstrated optic nerve sheath dilation and some flattening of the sella. MRV negative for CVST. LP was therefore ordered and demonstrated an opening pressure of 40htW99 and patient was started on Diamox 500mg BID.     In regards to vision patient wears glasses for night driving and computer.She denies any double vision. She reports difficulty focusing and blurred vision when reading, which patient attributed to using electronics. She denies any transient visual obscurations, but does endorse pulsatile tinnitus. Of note patient was seen in the Ed as a sophomore in college for severe headache where there was concern for optic disc edema at that time. She denies any recent weight gain, retinoic acid use, tetracycline use.     Patient has a + history of migraines since childhood and experiences some head pain all the time. Mom had migraines.Testing to see if patient has rheumatoid arthritis.  "Headaches are described as a pounding periorbital pain, which have been worsening over the past few months. With the severe headaches she has light sensitivity, occasional nausea, and tinnitus. The severe headaches last all day. She reports a daily dull headache. She currently does not take any medications for headaches. She denies any positional component to the headaches.     Weight: 94.3 kg  Height: 5'10\"    Other        Review of Systems:  Review of Systems   Eyes:  Positive for blurred vision.   Neurological:         Migraines   All other systems reviewed and are negative.      Past Medical History:   Past Medical History:   Diagnosis Date    Migraine        Past Surgical History:  No past surgical history on file.    Current Outpatient Medications:  Current Outpatient Medications   Medication Sig Dispense Refill    acetaZOLAMIDE SR (DIAMOX) 500 MG CAPSULE SR 12 HR Take 1 Capsule by mouth every day. 60 Capsule 1    ibuprofen (MOTRIN) 600 MG Tab Take 600 mg by mouth. (Patient not taking: Reported on 7/9/2024)      asa/apap/caffeine (EXCEDRIN) 250-250-65 MG Tab Take 1 Tablet by mouth. (Patient not taking: Reported on 7/9/2024)      levonorgestrel (MIRENA) 20 MCG/DAY IUD 1 Each by Intrauterine route one time.       No current facility-administered medications for this visit.       Allergies:  Allergies   Allergen Reactions    Penicillins Hives       Family History:  Family History   Problem Relation Age of Onset    Thyroid Mother     Rheumatologic Disease Father         RA    Fibromyalgia Father     Rheumatologic Disease Paternal Grandmother         Lupus    Hypertension Paternal Grandmother        Social History:  Social History     Socioeconomic History    Marital status:      Spouse name: Not on file    Number of children: Not on file    Years of education: Not on file    Highest education level: Not on file   Occupational History    Not on file   Tobacco Use    Smoking status: Never    Smokeless " tobacco: Never   Vaping Use    Vaping status: Never Used   Substance and Sexual Activity    Alcohol use: Yes     Comment: estimate once a week    Drug use: No    Sexual activity: Not on file   Other Topics Concern    Not on file   Social History Narrative    Not on file     Social Drivers of Health     Financial Resource Strain: Not on file   Food Insecurity: Not on file   Transportation Needs: Not on file   Physical Activity: Not on file   Stress: Not on file   Social Connections: Not on file   Intimate Partner Violence: Not on file   Housing Stability: Not on file          Physical Exam:  Physical Exam    Oriented x 3  Weight/BMI: There is no height or weight on file to calculate BMI.  There were no vitals taken for this visit.    Base Eye Exam       Visual Acuity (Snellen - Linear)         Right Left    Dist sc 20/20 -1 20/25              Tonometry (i[facility], 7:43 AM)         Right Left    Pressure 12 12              Pupils         Pupils Dark Light Shape React APD    Right PERRL 4 3 Round Brisk None    Left PERRL 4 3 Round Brisk None              Visual Fields         Right Left     Full Full              Extraocular Movement         Right Left     Full, Ortho Full, Ortho              Neuro/Psych       Oriented x3: Yes    Mood/Affect: Normal                  Additional Tests       Color         Right Left    Ishihara 9/9 9/9                  Slit Lamp and Fundus Exam       External Exam         Right Left    External Normal Normal              Slit Lamp Exam         Right Left    Lids/Lashes Normal Normal    Conjunctiva/Sclera White and quiet White and quiet    Cornea Clear Clear    Anterior Chamber Deep and quiet Deep and quiet    Iris Round and reactive Round and reactive    Lens Clear Clear              Fundus Exam         Right Left    Disc pink, sharp disc margins pink, sharp disc margins    C/D Ratio 0.0 0.0    Macula Normal Normal                  Refraction       Manifest Refraction (Auto)         Sphere  Cylinder Axis    Right -0.25 +0.25 068    Left +0.00 +0.25 158                    Pertinent Lab/Test/Imaging Review:  MRI brain wwo 8/11/23 (report only)  FINDINGS: No evidence of mass, hydrocephalus or extra-axial collection. No evidence of hemorrhage or infarction. No significant foci of abnormal signal within the brain parenchyma. No unusual enhancement following administration of gadolinium contrast.   Optic nerve sheaths are slightly distended with CSF.  The suprasellar cistern is prominent tendinous pituitary gland is appears to be slightly compressed within the sella.  No evidence of tonsillar ectopia.  Superior sagittal sinus and transverse sinuses appear to be patent though they     MRV head (report only)  FINDINGS:   There is symmetric focal attenuation of lateral transverse-proximal sigmoid sinuses either related to congenital variant or chronic venous stenosis.  The left sigmoid sinus is smaller than the right.  Superior sagittal sinus, straight sinus, proximal transverse sinuses, proximal jugular veins appear normal.  No filling defects seen within the major dural venous sinuses to suggest acute thrombus.     Lumbar puncture 12/5/23: opening pressure 41 cmH20  Glucose 58, protein 17.3, 0 WBC    Assessment and Plan:     IIH (idiopathic intracranial hypertension)  27 yo woman presents with history of headaches noted to have disc elevation.       Exam 10/26/23: RNFL elevation 190OD and 206 OS. VA 20/20 OD and 20/25 OS, full color plates. MRI brain demonstrates a partially empty sella and optic nerve sheath dilation. MRV negative for CVST    Exam 7/9/24: RNFL 102  OS, VA 20/20 OD 20/25 OS, full color plates, full confrontational fields. Optic disc edema resolved.     Exam 9/4/24: RNFL 102  OS. VA 20/20 OD 20/25 OS, full color plates, full confrontational fields. Optic disc pink with sharp disc margins    Exam 11/5/24: RNFL 99 OU. VA 20/20-1 OD 20/25 OS, 9/9 color plates OU, no APD, full  confrontational fields. Optic discs pink with sharp disc margins    Plan:   Resolved disc edema OU on Diamox 500mg daily. RNFL stable from prior visit therefore will stop diamox.Discussed signs and symptoms for sooner evaluation. RTC in 2-3 months for repeat evaluation    -RTC in 2-3 months   -Stop Diamox  -Continue healthy lifestyle modifications              Myron Weston M.D.  15 total minutes were spent reviewing imaging, records, examining the patient and documenting.

## 2024-11-05 NOTE — ASSESSMENT & PLAN NOTE
27 yo woman presents with history of headaches noted to have disc elevation.       Exam 10/26/23: RNFL elevation 190OD and 206 OS. VA 20/20 OD and 20/25 OS, full color plates. MRI brain demonstrates a partially empty sella and optic nerve sheath dilation. MRV negative for CVST    Exam 7/9/24: RNFL 102  OS, VA 20/20 OD 20/25 OS, full color plates, full confrontational fields. Optic disc edema resolved.     Exam 9/4/24: RNFL 102  OS. VA 20/20 OD 20/25 OS, full color plates, full confrontational fields. Optic disc pink with sharp disc margins    Exam 11/5/24: RNFL 99 OU. VA 20/20-1 OD 20/25 OS, 9/9 color plates OU, no APD, full confrontational fields. Optic discs pink with sharp disc margins    Plan:   Resolved disc edema OU on Diamox 500mg daily. RNFL stable from prior visit therefore will stop diamox.Discussed signs and symptoms for sooner evaluation. RTC in 2-3 months for repeat evaluation    -RTC in 2-3 months   -Stop Diamox  -Continue healthy lifestyle modifications